# Patient Record
Sex: MALE | Race: WHITE | NOT HISPANIC OR LATINO
[De-identification: names, ages, dates, MRNs, and addresses within clinical notes are randomized per-mention and may not be internally consistent; named-entity substitution may affect disease eponyms.]

---

## 2019-01-22 ENCOUNTER — RX RENEWAL (OUTPATIENT)
Age: 49
End: 2019-01-22

## 2019-02-04 ENCOUNTER — RX RENEWAL (OUTPATIENT)
Age: 49
End: 2019-02-04

## 2019-02-04 ENCOUNTER — OTHER (OUTPATIENT)
Age: 49
End: 2019-02-04

## 2019-05-07 ENCOUNTER — RECORD ABSTRACTING (OUTPATIENT)
Age: 49
End: 2019-05-07

## 2019-09-26 ENCOUNTER — APPOINTMENT (OUTPATIENT)
Dept: ENDOCRINOLOGY | Facility: CLINIC | Age: 49
End: 2019-09-26
Payer: COMMERCIAL

## 2019-09-26 VITALS
HEART RATE: 64 BPM | DIASTOLIC BLOOD PRESSURE: 68 MMHG | BODY MASS INDEX: 31.69 KG/M2 | SYSTOLIC BLOOD PRESSURE: 122 MMHG | WEIGHT: 234 LBS | HEIGHT: 72 IN

## 2019-09-26 PROCEDURE — 99213 OFFICE O/P EST LOW 20 MIN: CPT

## 2019-09-26 NOTE — ASSESSMENT
[FreeTextEntry1] : Hypothyroidism, clinically euthyroid on replacement\par Updated thyroid function tests ordered

## 2019-09-26 NOTE — PHYSICAL EXAM
[Thyroid Not Enlarged] : the thyroid was not enlarged [Regular Rhythm] : with a regular rhythm [Clear to Auscultation] : lungs were clear to auscultation bilaterally

## 2019-11-08 ENCOUNTER — RX RENEWAL (OUTPATIENT)
Age: 49
End: 2019-11-08

## 2020-09-28 ENCOUNTER — APPOINTMENT (OUTPATIENT)
Dept: ENDOCRINOLOGY | Facility: CLINIC | Age: 50
End: 2020-09-28

## 2020-11-11 ENCOUNTER — APPOINTMENT (OUTPATIENT)
Dept: ENDOCRINOLOGY | Facility: CLINIC | Age: 50
End: 2020-11-11
Payer: COMMERCIAL

## 2020-11-11 PROCEDURE — 99441: CPT

## 2020-11-11 NOTE — HISTORY OF PRESENT ILLNESS
[Home] : at home, [unfilled] , at the time of the visit. [Other Location: e.g. Home (Enter Location, City,State)___] : at [unfilled] [Verbal consent obtained from patient] : the patient, [unfilled] [FreeTextEntry1] : Hypothyroid for 18 years, found on screening due to family history.\par On T4 .2

## 2021-06-27 ENCOUNTER — RX RENEWAL (OUTPATIENT)
Age: 51
End: 2021-06-27

## 2022-05-09 ENCOUNTER — APPOINTMENT (OUTPATIENT)
Dept: ENDOCRINOLOGY | Facility: CLINIC | Age: 52
End: 2022-05-09
Payer: COMMERCIAL

## 2022-05-09 PROCEDURE — 99214 OFFICE O/P EST MOD 30 MIN: CPT | Mod: 95

## 2022-05-09 NOTE — HISTORY OF PRESENT ILLNESS
[Home] : at home, [unfilled] , at the time of the visit. [Medical Office: ([facility])___] : at the medical office located in  [Verbal consent obtained from patient] : the patient, [unfilled] [FreeTextEntry1] : Hypothyroid for 20 years, found on screening due to family history.\par On T4 .2

## 2022-08-09 ENCOUNTER — RX RENEWAL (OUTPATIENT)
Age: 52
End: 2022-08-09

## 2022-11-21 ENCOUNTER — RX RENEWAL (OUTPATIENT)
Age: 52
End: 2022-11-21

## 2023-04-04 ENCOUNTER — APPOINTMENT (OUTPATIENT)
Dept: FAMILY MEDICINE | Facility: CLINIC | Age: 53
End: 2023-04-04
Payer: COMMERCIAL

## 2023-04-04 VITALS
BODY MASS INDEX: 31.42 KG/M2 | WEIGHT: 232 LBS | OXYGEN SATURATION: 97 % | HEIGHT: 72 IN | DIASTOLIC BLOOD PRESSURE: 80 MMHG | SYSTOLIC BLOOD PRESSURE: 132 MMHG | TEMPERATURE: 96.3 F | HEART RATE: 87 BPM

## 2023-04-04 DIAGNOSIS — Z00.00 ENCOUNTER FOR GENERAL ADULT MEDICAL EXAMINATION W/OUT ABNORMAL FINDINGS: ICD-10-CM

## 2023-04-04 PROCEDURE — 99386 PREV VISIT NEW AGE 40-64: CPT

## 2023-04-04 NOTE — HISTORY OF PRESENT ILLNESS
[de-identified] : Pt presenting for CPE\par Doing well\par Takes levoxyl 200 mcg on daily basis, got thyroid checked 6 months prior \par Takes multiple vitamins and supplements on daily basis. \par \par Fam hx- bypass in father, CAD\par

## 2023-04-04 NOTE — ASSESSMENT
[FreeTextEntry1] : Pt presenting for annual physical. \par \par Reviewed diet, exercise (150 min/moderate intensity exercise weekly), lifestyle modifications. \par Discussed STD prevention and discussed screening studies per below:\par \par Colon Cancer- GI referral \par \par Labs ordered per above. \par f.u in 1 year or earlier if needed \par \par Cardio and Gastro referral given\par

## 2023-04-07 ENCOUNTER — NON-APPOINTMENT (OUTPATIENT)
Age: 53
End: 2023-04-07

## 2023-04-07 LAB
25(OH)D3 SERPL-MCNC: 99.9 NG/ML
ALBUMIN SERPL ELPH-MCNC: 4.5 G/DL
ALP BLD-CCNC: 85 U/L
ALT SERPL-CCNC: 41 U/L
ANION GAP SERPL CALC-SCNC: 14 MMOL/L
AST SERPL-CCNC: 25 U/L
BASOPHILS # BLD AUTO: 0.03 K/UL
BASOPHILS NFR BLD AUTO: 0.3 %
BILIRUB SERPL-MCNC: 0.4 MG/DL
BUN SERPL-MCNC: 21 MG/DL
CALCIUM SERPL-MCNC: 9.7 MG/DL
CHLORIDE SERPL-SCNC: 103 MMOL/L
CHOLEST SERPL-MCNC: 231 MG/DL
CO2 SERPL-SCNC: 23 MMOL/L
CREAT SERPL-MCNC: 1.29 MG/DL
EGFR: 66 ML/MIN/1.73M2
EOSINOPHIL # BLD AUTO: 0.21 K/UL
EOSINOPHIL NFR BLD AUTO: 2.1 %
ESTIMATED AVERAGE GLUCOSE: 114 MG/DL
FERRITIN SERPL-MCNC: 91 NG/ML
FOLATE SERPL-MCNC: >20 NG/ML
GLUCOSE SERPL-MCNC: 94 MG/DL
HBA1C MFR BLD HPLC: 5.6 %
HCT VFR BLD CALC: 47.5 %
HDLC SERPL-MCNC: 56 MG/DL
HGB BLD-MCNC: 15.3 G/DL
IMM GRANULOCYTES NFR BLD AUTO: 0.2 %
IRON SATN MFR SERPL: 13 %
IRON SERPL-MCNC: 39 UG/DL
LDLC SERPL CALC-MCNC: 145 MG/DL
LYMPHOCYTES # BLD AUTO: 2.18 K/UL
LYMPHOCYTES NFR BLD AUTO: 21.7 %
MAN DIFF?: NORMAL
MCHC RBC-ENTMCNC: 30.5 PG
MCHC RBC-ENTMCNC: 32.2 GM/DL
MCV RBC AUTO: 94.8 FL
MONOCYTES # BLD AUTO: 1.28 K/UL
MONOCYTES NFR BLD AUTO: 12.8 %
NEUTROPHILS # BLD AUTO: 6.31 K/UL
NEUTROPHILS NFR BLD AUTO: 62.9 %
NONHDLC SERPL-MCNC: 175 MG/DL
PLATELET # BLD AUTO: 223 K/UL
POTASSIUM SERPL-SCNC: 4.2 MMOL/L
PROT SERPL-MCNC: 7.4 G/DL
PSA SERPL-MCNC: 0.91 NG/ML
RBC # BLD: 5.01 M/UL
RBC # FLD: 12.8 %
SODIUM SERPL-SCNC: 139 MMOL/L
T4 FREE SERPL-MCNC: 1.7 NG/DL
TIBC SERPL-MCNC: 314 UG/DL
TRIGL SERPL-MCNC: 151 MG/DL
TSH SERPL-ACNC: 0.39 UIU/ML
UIBC SERPL-MCNC: 275 UG/DL
VIT B12 SERPL-MCNC: 1752 PG/ML
WBC # FLD AUTO: 10.03 K/UL

## 2023-04-20 ENCOUNTER — APPOINTMENT (OUTPATIENT)
Dept: GASTROENTEROLOGY | Facility: CLINIC | Age: 53
End: 2023-04-20
Payer: COMMERCIAL

## 2023-04-20 VITALS
HEIGHT: 72 IN | BODY MASS INDEX: 31.15 KG/M2 | DIASTOLIC BLOOD PRESSURE: 70 MMHG | SYSTOLIC BLOOD PRESSURE: 114 MMHG | HEART RATE: 85 BPM | WEIGHT: 230 LBS | TEMPERATURE: 97.2 F | RESPIRATION RATE: 16 BRPM | OXYGEN SATURATION: 98 %

## 2023-04-20 DIAGNOSIS — Z78.9 OTHER SPECIFIED HEALTH STATUS: ICD-10-CM

## 2023-04-20 PROCEDURE — 99203 OFFICE O/P NEW LOW 30 MIN: CPT

## 2023-04-20 NOTE — PHYSICAL EXAM
[Alert] : alert [Normal Voice/Communication] : normal voice/communication [Healthy Appearing] : healthy appearing [No Acute Distress] : no acute distress [Sclera] : the sclera and conjunctiva were normal [Hearing Threshold Finger Rub Not [place]] : hearing was normal [Normal Lips/Gums] : the lips and gums were normal [Oropharynx] : the oropharynx was normal [Normal Appearance] : the appearance of the neck was normal [No Respiratory Distress] : no respiratory distress [No Acc Muscle Use] : no accessory muscle use [Respiration, Rhythm And Depth] : normal respiratory rhythm and effort [Auscultation Breath Sounds / Voice Sounds] : lungs were clear to auscultation bilaterally [Heart Rate And Rhythm] : heart rate was normal and rhythm regular [Normal S1, S2] : normal S1 and S2 [Murmurs] : no murmurs [Bowel Sounds] : normal bowel sounds [Abdomen Tenderness] : non-tender [No Masses] : no abdominal mass palpated [Abdomen Soft] : soft [Abnormal Walk] : normal gait [Involuntary Movements] : no involuntary movements were seen [Motor Tone] : muscle strength and tone were normal [Normal Color / Pigmentation] : normal skin color and pigmentation [] : no rash [No Focal Deficits] : no focal deficits [Motor Exam] : the motor exam was normal [Normal Affect] : the affect was normal [Oriented To Time, Place, And Person] : oriented to person, place, and time [Normal Mood] : the mood was normal

## 2023-04-20 NOTE — HISTORY OF PRESENT ILLNESS
[FreeTextEntry1] : Patient is referred for routine colon cancer screening.  He has never undergone a colonoscopy.  There is no family history of colon cancer.  He denies any abdominal pain, nausea, vomiting, diarrhea or constipation.  For the past 2 years on rare occasion he might note some scant blood on the toilet paper after a bowel movement.  There are no dyspeptic symptoms.  His appetite and weight are stable.

## 2023-04-20 NOTE — ASSESSMENT
[FreeTextEntry1] : At this time the patient is certainly a candidate for an initial screening colonoscopy which will be scheduled. The risks, benefits, complications and possible adverse consequences associated with colonoscopy were discussed with the patient.  The scant blood on the tissue is probably either hemorrhoidal or some perianal irritation.\par

## 2023-04-28 RX ORDER — SODIUM SULFATE, POTASSIUM SULFATE AND MAGNESIUM SULFATE 1.6; 3.13; 17.5 G/177ML; G/177ML; G/177ML
17.5-3.13-1.6 SOLUTION ORAL
Qty: 1 | Refills: 0 | Status: ACTIVE | COMMUNITY
Start: 2023-04-20 | End: 1900-01-01

## 2023-06-28 ENCOUNTER — APPOINTMENT (OUTPATIENT)
Dept: CARDIOLOGY | Facility: CLINIC | Age: 53
End: 2023-06-28
Payer: COMMERCIAL

## 2023-06-28 ENCOUNTER — NON-APPOINTMENT (OUTPATIENT)
Age: 53
End: 2023-06-28

## 2023-06-28 VITALS
DIASTOLIC BLOOD PRESSURE: 76 MMHG | HEART RATE: 81 BPM | BODY MASS INDEX: 30.34 KG/M2 | HEIGHT: 72 IN | OXYGEN SATURATION: 97 % | SYSTOLIC BLOOD PRESSURE: 138 MMHG | WEIGHT: 224 LBS

## 2023-06-28 DIAGNOSIS — Z12.11 ENCOUNTER FOR SCREENING FOR MALIGNANT NEOPLASM OF COLON: ICD-10-CM

## 2023-06-28 DIAGNOSIS — Z13.6 ENCOUNTER FOR SCREENING FOR CARDIOVASCULAR DISORDERS: ICD-10-CM

## 2023-06-28 DIAGNOSIS — E78.2 MIXED HYPERLIPIDEMIA: ICD-10-CM

## 2023-06-28 DIAGNOSIS — Z13.228 ENCOUNTER FOR SCREENING FOR OTHER SUSPECTED ENDOCRINE DISORDER: ICD-10-CM

## 2023-06-28 DIAGNOSIS — Z13.29 ENCOUNTER FOR SCREENING FOR OTHER SUSPECTED ENDOCRINE DISORDER: ICD-10-CM

## 2023-06-28 DIAGNOSIS — R03.0 ELEVATED BLOOD-PRESSURE READING, W/OUT DIAGNOSIS OF HYPERTENSION: ICD-10-CM

## 2023-06-28 DIAGNOSIS — R01.1 CARDIAC MURMUR, UNSPECIFIED: ICD-10-CM

## 2023-06-28 DIAGNOSIS — Z13.0 ENCOUNTER FOR SCREENING FOR OTHER SUSPECTED ENDOCRINE DISORDER: ICD-10-CM

## 2023-06-28 PROCEDURE — 99204 OFFICE O/P NEW MOD 45 MIN: CPT | Mod: 25

## 2023-06-28 PROCEDURE — 93000 ELECTROCARDIOGRAM COMPLETE: CPT

## 2023-06-28 NOTE — HISTORY OF PRESENT ILLNESS
[FreeTextEntry1] : Rajinder Rodriguez is a 52-year-old man with a history of hypothyroidism, hyperlipidemia, overweight, and family history of ischemic heart disease (father underwent bypass surgery in mid 4th decade), who presents for cardiology consultation.  He has been feeling well and has not been experiencing chest discomfort, dyspnea, or palpitations.  He is concerned about risk factors for heart disease and requests screening.  He has a good baseline functional status––works as a  and golfs.  He describes a recent lifestyle modifications with a goal of weight loss and improve overall cardiovascular health.\par \par *I reviewed recent laboratory data (April 2023): Cholesterol is suboptimally controlled (total 231, , triglyceride 151, and HDL 56)\par

## 2023-06-28 NOTE — PHYSICAL EXAM
[No Acute Distress] : no acute distress [Normal S1, S2] : normal S1, S2 [Clear Lung Fields] : clear lung fields [Soft] : abdomen soft [Non Tender] : non-tender [Normal Gait] : normal gait [Gait - Sufficient for Exercise Testing] : gait - sufficient for exercise testing [No Edema] : no edema [Moves all extremities] : moves all extremities [Normal Speech] : normal speech [Alert and Oriented] : alert and oriented [de-identified] :   Overweight [de-identified] :  extraocular muscles are intact [de-identified] :  normocephalic [de-identified] :  no JVD [de-identified] :  systolic murmur [de-identified] :  warm and dry

## 2023-06-28 NOTE — REVIEW OF SYSTEMS
[Weight Loss (___ Lbs)] : [unfilled] ~Ulb weight loss [Negative] : Heme/Lymph [SOB] : no shortness of breath [Chest Discomfort] : no chest discomfort [Palpitations] : no palpitations

## 2023-06-28 NOTE — DISCUSSION/SUMMARY
[FreeTextEntry1] : \par Hyperlipidemia: Elevated LDL––trial of lifestyle modification with diet, exercise, and weight loss are reasonable.  He describes approximate 10 pound weight loss over the past few months as he tries to improve his overall health.  His goal LDL should be less than 100.\par \par Elevated blood pressure in the absence of established hypertension: Mild– other blood pressure measurements in his chart are 114/70 and 132/80; may have prehypertension.  We discussed the importance of weight loss and exercise;  blood pressure should be monitored.\par \par Murmur: Murmur heard on cardiac auscultation; I recommend echocardiography.\par \par Screening for heart disease: Several risk factors for ischemic heart disease, including family history of premature CAD,  overweight, and elevated cholesterol.  I recommend exercise ECG stress test\par \par Hypothyroidism: Chronic;  recent TSH and free T4 are within normal limits;   Continue levothyroxine.

## 2023-07-10 ENCOUNTER — RX RENEWAL (OUTPATIENT)
Age: 53
End: 2023-07-10

## 2023-07-10 ENCOUNTER — TRANSCRIPTION ENCOUNTER (OUTPATIENT)
Age: 53
End: 2023-07-10

## 2023-07-11 ENCOUNTER — OUTPATIENT (OUTPATIENT)
Dept: OUTPATIENT SERVICES | Facility: HOSPITAL | Age: 53
LOS: 1 days | End: 2023-07-11
Payer: COMMERCIAL

## 2023-07-11 ENCOUNTER — APPOINTMENT (OUTPATIENT)
Dept: GASTROENTEROLOGY | Facility: GI CENTER | Age: 53
End: 2023-07-11
Payer: COMMERCIAL

## 2023-07-11 DIAGNOSIS — Z12.11 ENCOUNTER FOR SCREENING FOR MALIGNANT NEOPLASM OF COLON: ICD-10-CM

## 2023-07-11 DIAGNOSIS — R58 HEMORRHAGE, NOT ELSEWHERE CLASSIFIED: ICD-10-CM

## 2023-07-11 PROCEDURE — 45378 DIAGNOSTIC COLONOSCOPY: CPT | Mod: PT

## 2023-07-11 PROCEDURE — 45378 DIAGNOSTIC COLONOSCOPY: CPT

## 2023-07-11 NOTE — PHYSICAL EXAM
[Alert] : alert [Normal Voice/Communication] : normal voice/communication [Healthy Appearing] : healthy appearing [No Acute Distress] : no acute distress [Sclera] : the sclera and conjunctiva were normal [Hearing Threshold Finger Rub Not [place]] : hearing was normal [Normal Lips/Gums] : the lips and gums were normal [Oropharynx] : the oropharynx was normal [Normal Appearance] : the appearance of the neck was normal [No Respiratory Distress] : no respiratory distress [No Acc Muscle Use] : no accessory muscle use [Respiration, Rhythm And Depth] : normal respiratory rhythm and effort [Auscultation Breath Sounds / Voice Sounds] : lungs were clear to auscultation bilaterally [Heart Rate And Rhythm] : heart rate was normal and rhythm regular [Normal S1, S2] : normal S1 and S2 [Murmurs] : no murmurs [Bowel Sounds] : normal bowel sounds [Abdomen Tenderness] : non-tender [No Masses] : no abdominal mass palpated [Abdomen Soft] : soft [Abnormal Walk] : normal gait [Involuntary Movements] : no involuntary movements were seen [Motor Tone] : muscle strength and tone were normal [Normal Color / Pigmentation] : normal skin color and pigmentation [] : no rash [No Focal Deficits] : no focal deficits [Motor Exam] : the motor exam was normal [Oriented To Time, Place, And Person] : oriented to person, place, and time [Normal Affect] : the affect was normal [Normal Mood] : the mood was normal

## 2023-07-19 ENCOUNTER — NON-APPOINTMENT (OUTPATIENT)
Age: 53
End: 2023-07-19

## 2023-08-09 ENCOUNTER — APPOINTMENT (OUTPATIENT)
Dept: CARDIOLOGY | Facility: CLINIC | Age: 53
End: 2023-08-09
Payer: COMMERCIAL

## 2023-08-09 PROCEDURE — 93015 CV STRESS TEST SUPVJ I&R: CPT

## 2023-08-09 PROCEDURE — 93306 TTE W/DOPPLER COMPLETE: CPT

## 2023-10-02 ENCOUNTER — APPOINTMENT (OUTPATIENT)
Dept: ENDOCRINOLOGY | Facility: CLINIC | Age: 53
End: 2023-10-02
Payer: COMMERCIAL

## 2023-10-02 ENCOUNTER — RX RENEWAL (OUTPATIENT)
Age: 53
End: 2023-10-02

## 2023-10-02 VITALS
OXYGEN SATURATION: 97 % | HEIGHT: 72 IN | DIASTOLIC BLOOD PRESSURE: 80 MMHG | WEIGHT: 233 LBS | HEART RATE: 79 BPM | SYSTOLIC BLOOD PRESSURE: 122 MMHG | BODY MASS INDEX: 31.56 KG/M2

## 2023-10-02 DIAGNOSIS — E03.9 HYPOTHYROIDISM, UNSPECIFIED: ICD-10-CM

## 2023-10-02 PROCEDURE — 99214 OFFICE O/P EST MOD 30 MIN: CPT

## 2023-10-02 RX ORDER — LEVOTHYROXINE SODIUM 0.03 MG/1
25 TABLET ORAL
Qty: 90 | Refills: 3 | Status: ACTIVE | COMMUNITY
Start: 2022-06-01 | End: 1900-01-01

## 2023-10-02 RX ORDER — LEVOTHYROXINE SODIUM 0.2 MG/1
200 TABLET ORAL
Qty: 90 | Refills: 3 | Status: ACTIVE | COMMUNITY
Start: 2019-01-22 | End: 1900-01-01

## 2024-07-26 ENCOUNTER — NON-APPOINTMENT (OUTPATIENT)
Age: 54
End: 2024-07-26

## 2024-07-30 ENCOUNTER — APPOINTMENT (OUTPATIENT)
Dept: FAMILY MEDICINE | Facility: CLINIC | Age: 54
End: 2024-07-30
Payer: COMMERCIAL

## 2024-07-30 VITALS
HEART RATE: 73 BPM | SYSTOLIC BLOOD PRESSURE: 124 MMHG | HEIGHT: 72 IN | DIASTOLIC BLOOD PRESSURE: 78 MMHG | BODY MASS INDEX: 31.69 KG/M2 | WEIGHT: 234 LBS | OXYGEN SATURATION: 97 % | TEMPERATURE: 97.8 F

## 2024-07-30 DIAGNOSIS — M25.511 PAIN IN RIGHT SHOULDER: ICD-10-CM

## 2024-07-30 PROCEDURE — 99213 OFFICE O/P EST LOW 20 MIN: CPT

## 2024-07-30 NOTE — PHYSICAL EXAM
[No Acute Distress] : no acute distress [Well-Appearing] : well-appearing [No Spinal Tenderness] : no spinal tenderness [No Rash] : no rash [de-identified] : right trapezius hypertonicity; positive right apley scratch test

## 2024-07-30 NOTE — ASSESSMENT
[FreeTextEntry1] : #Right shoulder pain, neuropathy - Presenting to the office complaining of right upper back pain with numbness and tingling traversing down to the right elbow - On examination, there is right trapezius hypertonicity with tender point near spine of scapula. He has significant reduced ROM of the right shoulder and a positive Apley scratch test - Negative Tinel sign at right elbow and right wrist  - Concern for rotator cuff pathology vs thoracic outlet syndrome - Restart NSAIDs, alternate heat and ice - Orthopedics referral

## 2024-07-30 NOTE — HISTORY OF PRESENT ILLNESS
[FreeTextEntry8] : 55yo male presents to the office complaining of back pain near right shoulder pain.  Reports this started one month ago and shortly after developed numbness and tingling that went down the right arm to the elbow. Reports these sensations are intermittent. Endorses feeling like "dead arm."  Denies any known trauma or injury to the area, reports that it started one day in the middle of the day.  Feels like the arm is slightly weak, especially if he grabs a door handle.  Reports if he massages the area it will improve but only temporarily, pain and numbness/tingling will always return.  Was taking Advil in the beginning, but stopped taking this.

## 2024-07-30 NOTE — REVIEW OF SYSTEMS
[Muscle Pain] : muscle pain [Fever] : no fever [Chills] : no chills [Headache] : no headache [Dizziness] : no dizziness

## 2024-08-08 ENCOUNTER — NON-APPOINTMENT (OUTPATIENT)
Age: 54
End: 2024-08-08

## 2024-08-08 ENCOUNTER — APPOINTMENT (OUTPATIENT)
Dept: ORTHOPEDIC SURGERY | Facility: CLINIC | Age: 54
End: 2024-08-08

## 2024-08-08 PROBLEM — M54.12 CERVICAL RADICULOPATHY: Status: ACTIVE | Noted: 2024-08-08

## 2024-08-08 PROBLEM — G25.89 SCAPULAR DYSKINESIS: Status: ACTIVE | Noted: 2024-08-08

## 2024-08-08 PROBLEM — M62.838 TRAPEZIUS MUSCLE SPASM: Status: ACTIVE | Noted: 2024-08-08

## 2024-08-08 PROCEDURE — 72040 X-RAY EXAM NECK SPINE 2-3 VW: CPT

## 2024-08-08 PROCEDURE — 73030 X-RAY EXAM OF SHOULDER: CPT | Mod: RT

## 2024-08-08 PROCEDURE — 99204 OFFICE O/P NEW MOD 45 MIN: CPT

## 2024-08-08 NOTE — PHYSICAL EXAM
[de-identified] : General: Well appearing, no acute distress Neurologic: A&Ox3, No focal deficits Head: NCAT without abrasions, lacerations, or ecchymosis to head, face, or scalp Eyes: No scleral icterus, no gross abnormalities Respiratory: Equal chest wall expansion bilaterally, no accessory muscle use Lymphatic: No lymphadenopathy palpated Skin: Warm and dry Psychiatric: Normal mood and affect   Examination of the right shoulder reveals no obvious deformity.  There is no evidence of muscle atrophy.  The skin is intact.  There is no swelling, erythema, or ecchymosis.  The patient is nontender to palpation throughout the right shoulder including the anterior aspect of the shoulder near the bicipital groove, a.c. joint, trapezius and posterior shoulder.  The patient's active range of motion is as follows: Forward flexion to 175 degrees external rotation to 75 degrees and internal rotation to an upper lumbar level.  The patient has 5 out of 5 strength to forward flexion with pronation, internal and external rotation.  The patient has negative Mosqueda and Neer's tests.  There is no pain with cross body abduction testing.  There is a negative a.c. compression test and no sulcus sign present.  There is a negative liftoff test and negative yergason test.  The compartments of the arm are soft and nontender without evidence of DVT or compartment syndrome.  The patient is grossly neurovascularly intact distally. [de-identified] : 4 views of right shoulder were performed today and available for me to review. Results were discussed with the patient. They demonstrate no f/x, dislocation, or other deformity.  2 views of cervical spine were performed today and available for me to review. Results were discussed with the patient. They demonstrate no f/x, dislocation. Cervical spine is lacking lordosis on x-ray.

## 2024-08-08 NOTE — PHYSICAL EXAM
[de-identified] : General: Well appearing, no acute distress Neurologic: A&Ox3, No focal deficits Head: NCAT without abrasions, lacerations, or ecchymosis to head, face, or scalp Eyes: No scleral icterus, no gross abnormalities Respiratory: Equal chest wall expansion bilaterally, no accessory muscle use Lymphatic: No lymphadenopathy palpated Skin: Warm and dry Psychiatric: Normal mood and affect   Examination of the right shoulder reveals no obvious deformity.  There is no evidence of muscle atrophy.  The skin is intact.  There is no swelling, erythema, or ecchymosis.  The patient is nontender to palpation throughout the right shoulder including the anterior aspect of the shoulder near the bicipital groove, a.c. joint, trapezius and posterior shoulder.  The patient's active range of motion is as follows: Forward flexion to 175 degrees external rotation to 75 degrees and internal rotation to an upper lumbar level.  The patient has 5 out of 5 strength to forward flexion with pronation, internal and external rotation.  The patient has negative Mosqueda and Neer's tests.  There is no pain with cross body abduction testing.  There is a negative a.c. compression test and no sulcus sign present.  There is a negative liftoff test and negative yergason test.  The compartments of the arm are soft and nontender without evidence of DVT or compartment syndrome.  The patient is grossly neurovascularly intact distally. [de-identified] : 4 views of right shoulder were performed today and available for me to review. Results were discussed with the patient. They demonstrate no f/x, dislocation, or other deformity.  2 views of cervical spine were performed today and available for me to review. Results were discussed with the patient. They demonstrate no f/x, dislocation. Cervical spine is lacking lordosis on x-ray.

## 2024-08-08 NOTE — CONSULT LETTER
[Dear  ___] : Dear  [unfilled], [Consult Letter:] : I had the pleasure of evaluating your patient, [unfilled]. [Please see my note below.] : Please see my note below. [Sincerely,] : Sincerely, [FreeTextEntry3] : Dr. Jerry Rushing

## 2024-08-08 NOTE — ADDENDUM
[FreeTextEntry1] : Documented by Angelica Lynn acting as a scribe for Dr. Rushing and Chico Garcia PA-C on 08/08/2024. All medical record entries made by the Scribe were at my, Dr. Rushing, and Chico Garcia's, direction and personally dictated by me on 08/08/2024. I have reviewed the chart and agree that the record accurately reflects my personal performance of the history, physical exam, procedure and imaging.

## 2024-08-08 NOTE — HISTORY OF PRESENT ILLNESS
[Worsening] : worsening [___ mths] : [unfilled] month(s) ago [de-identified] : MELANIA TINAJERO is a 54 year old male being seen for atraumatic right shoulder pain for 5 weeks. He is here with complaints of a muscle spasm in his shoulder blade with numbness and tingling in his right arm. He notes the numbness and tingling he experiences is intermittent throughout the day. He notes he feels decreased range of motion in the involved shoulder. He notes he feels occasional weakness in his arm. He states he is right hand dominant.

## 2024-08-08 NOTE — DISCUSSION/SUMMARY
[de-identified] : We had a thorough discussion regarding the nature of his pain, the pathophysiology, as well as all treatment options. I discussed operative and non-operative treatment modalities. Advise patient follow up with either Dr. Elaine, or Dr. Lam for further evaluation. All questions were answered and the patient verbalized understanding. The patient is in agreement with this treatment plan. Patient will follow up as needed for repeat clinical assessment.

## 2024-08-08 NOTE — DISCUSSION/SUMMARY
[de-identified] : We had a thorough discussion regarding the nature of his pain, the pathophysiology, as well as all treatment options. I discussed operative and non-operative treatment modalities. Advise patient follow up with either Dr. Elaine, or Dr. Lam for further evaluation. All questions were answered and the patient verbalized understanding. The patient is in agreement with this treatment plan. Patient will follow up as needed for repeat clinical assessment.

## 2024-08-08 NOTE — HISTORY OF PRESENT ILLNESS
[Worsening] : worsening [___ mths] : [unfilled] month(s) ago [de-identified] : MELANIA TINAJERO is a 54 year old male being seen for atraumatic right shoulder pain for 5 weeks. He is here with complaints of a muscle spasm in his shoulder blade with numbness and tingling in his right arm. He notes the numbness and tingling he experiences is intermittent throughout the day. He notes he feels decreased range of motion in the involved shoulder. He notes he feels occasional weakness in his arm. He states he is right hand dominant.

## 2024-08-19 ENCOUNTER — APPOINTMENT (OUTPATIENT)
Dept: PHYSICAL MEDICINE AND REHAB | Facility: CLINIC | Age: 54
End: 2024-08-19
Payer: COMMERCIAL

## 2024-08-19 VITALS
RESPIRATION RATE: 15 BRPM | DIASTOLIC BLOOD PRESSURE: 82 MMHG | BODY MASS INDEX: 31.83 KG/M2 | HEART RATE: 74 BPM | SYSTOLIC BLOOD PRESSURE: 125 MMHG | WEIGHT: 235 LBS | HEIGHT: 72 IN

## 2024-08-19 DIAGNOSIS — M54.12 RADICULOPATHY, CERVICAL REGION: ICD-10-CM

## 2024-08-19 DIAGNOSIS — M25.511 PAIN IN RIGHT SHOULDER: ICD-10-CM

## 2024-08-19 DIAGNOSIS — G25.89 OTHER SPECIFIED EXTRAPYRAMIDAL AND MOVEMENT DISORDERS: ICD-10-CM

## 2024-08-19 PROCEDURE — 99204 OFFICE O/P NEW MOD 45 MIN: CPT

## 2024-08-19 NOTE — DATA REVIEWED
[FreeTextEntry1] : X-ray C Spine 8/8/24 reviewed and interpreted by me: loss of cervical lordosis seen

## 2024-08-19 NOTE — ASSESSMENT
[FreeTextEntry1] : Mr. MELANIA TINAJERO is a 54 year old male presenting for 6 weeks of radiating right shoulder pain. At this time, pain most consistent with right sided cervical radiculopathy. Denies any red flag signs. Will recommend: -reviewed C spine XRs -PT prescription (sent today) - 2-3x/week for 6-8 weeks for strengthening, stretching, and ROM of the c spine using modalities prn (heat, massage, etc) and to develop a HEP -MRI C spine ordered today -advised patient ok to take advil prn but to avoid taking NSAIDs on a daily basis, may supplement pain prn medications w/ tylenol and topical medications such as lidocaine, biofreeze, etc   RTC in 3-4 weeks. Patient aware of red flag signs including any changes to their bowel/bladder control, groin numbness or new weakness. Patient knows to seek immediate attention by calling 911 or going to nearest ER if these symptoms appear.

## 2024-08-19 NOTE — PHYSICAL EXAM
[FreeTextEntry1] : PE: Constitutional: In NAD, calm and cooperative MSK (Neck): 	Inspection: no gross swelling identified 	Palpation: Tenderness of the medial border of the right scapula 	ROM: Pain at end cervical extension 	Strength: 5/5 strength in bilateral upper extremities 	Reflexes: 2+ Biceps/Brachioradialis/Triceps/patella/Achilles reflex bilaterally, Hoffmans/Clonus negative bilaterally 	Sensation: Intact to light touch in bilateral upper extremities 	Special tests: Spurlings test negative bilaterally

## 2024-08-19 NOTE — HISTORY OF PRESENT ILLNESS
[FreeTextEntry1] : Mr. MELANIA TINAJERO  is a 54 year old male who presents with right shoulder blade pain  Location: medial border of the right shoulder pain, upper back Onset: Started around late 6/2024, no inciting event Provocation/Palliative: worse with positioning in patient's care or sitting for long periods of time with next in extension, massaging the area with a theragun improves some pain Quality: pulling sensation Radiation: travels down the R arm into the finger tips with associated numbness, tingling, feeling like he "hit his funny bone" Severity: Moderate Timing: near constant without improvement over past 5-6 weeks  Admits to numbness/tingling in R arm at times.  Denies any associated arm or hand weakness. Denies any loss of bowel/bladder control or any groin numbness. Previous medications trialed: Advil with some relief Previous procedures relevant to complaint: none Has tried conservative treatment?: none

## 2024-09-03 ENCOUNTER — OUTPATIENT (OUTPATIENT)
Dept: OUTPATIENT SERVICES | Facility: HOSPITAL | Age: 54
LOS: 1 days | End: 2024-09-03
Payer: COMMERCIAL

## 2024-09-03 ENCOUNTER — APPOINTMENT (OUTPATIENT)
Dept: MRI IMAGING | Facility: CLINIC | Age: 54
End: 2024-09-03
Payer: COMMERCIAL

## 2024-09-03 DIAGNOSIS — M54.12 RADICULOPATHY, CERVICAL REGION: ICD-10-CM

## 2024-09-03 PROCEDURE — 72141 MRI NECK SPINE W/O DYE: CPT | Mod: 26

## 2024-09-03 PROCEDURE — 72141 MRI NECK SPINE W/O DYE: CPT

## 2024-09-06 ENCOUNTER — TRANSCRIPTION ENCOUNTER (OUTPATIENT)
Age: 54
End: 2024-09-06

## 2024-09-26 ENCOUNTER — APPOINTMENT (OUTPATIENT)
Dept: PHYSICAL MEDICINE AND REHAB | Facility: CLINIC | Age: 54
End: 2024-09-26
Payer: COMMERCIAL

## 2024-09-26 VITALS
HEART RATE: 74 BPM | SYSTOLIC BLOOD PRESSURE: 134 MMHG | BODY MASS INDEX: 31.83 KG/M2 | OXYGEN SATURATION: 98 % | DIASTOLIC BLOOD PRESSURE: 86 MMHG | HEIGHT: 72 IN | WEIGHT: 235 LBS | RESPIRATION RATE: 14 BRPM

## 2024-09-26 DIAGNOSIS — M79.10 MYALGIA, UNSPECIFIED SITE: ICD-10-CM

## 2024-09-26 DIAGNOSIS — M25.511 PAIN IN RIGHT SHOULDER: ICD-10-CM

## 2024-09-26 DIAGNOSIS — M54.12 RADICULOPATHY, CERVICAL REGION: ICD-10-CM

## 2024-09-26 PROCEDURE — 99214 OFFICE O/P EST MOD 30 MIN: CPT | Mod: 25

## 2024-09-26 PROCEDURE — 20553 NJX 1/MLT TRIGGER POINTS 3/>: CPT

## 2024-09-26 RX ORDER — METHYLPREDNISOLONE 4 MG/1
4 TABLET ORAL
Qty: 1 | Refills: 0 | Status: ACTIVE | COMMUNITY
Start: 2024-09-26 | End: 1900-01-01

## 2024-09-26 NOTE — ASSESSMENT
[FreeTextEntry1] : Mr. MELANIA TINAJERO is a 54 year old male presenting for 6 weeks of radiating right shoulder pain. At this time, pain most consistent with right sided cervical radiculopathy in combination with myofascial based pain. Denies any red flag signs. Will recommend: - Reviewed MRI C Spine with patient - TPI performed today, tolerated well - Continue PT - Medrol dose shayne Rx given, to take as recommended. Patient advised on potential side effects including but not limited to increased risk of elevated blood sugar, blood pressure, and infection. Patient encouraged to take medication with food and not with NSAIDs.  - Discussed with patient the risks (including but not limited to bleeding, elevated blood sugar, allergic reaction, infection, nerve damage, etc), benefits and alternatives to an epidural steroid injection for which patient understands. Will see how he does with medrol dose shayne, more PT and the TPIs first.    RTC in 3-4 weeks. Patient aware of red flag signs including any changes to their bowel/bladder control, groin numbness or new weakness. Patient knows to seek immediate attention by calling 911 or going to nearest ER if these symptoms appear.  This patient is being managed for a complex chronic pain that requires ongoing medical management. The nature of this condition requires a longitudinal relationship and monitoring over time for appropriate treatment.

## 2024-09-26 NOTE — PHYSICAL EXAM
[FreeTextEntry1] : PE: Constitutional: In NAD, calm and cooperative MSK (Neck):  Inspection: no gross swelling identified  Palpation: Tenderness of the medial border of the right scapula and over R cervical paraspinals  ROM: Pain at end cervical extension  Strength: 5/5 strength in bilateral upper extremities  Reflexes: 2+ Biceps/Brachioradialis/Triceps/patella/Achilles reflex bilaterally, Hoffmans/Clonus negative bilaterally  Sensation: Intact to light touch in bilateral upper extremities  Special tests: Spurlings test negative bilaterally.

## 2024-09-26 NOTE — HISTORY OF PRESENT ILLNESS
[FreeTextEntry1] : Mr. MELANIA TINAJERO is a 54 year old male who presents for follow up. At last visit, he was ordered PT, MRI C Spine, and was continued on OTC meds. He feels a little better with PT but still having significant pain.    Location: medial border of the right shoulder pain, upper back Onset: Started around late 6/2024, no inciting event Provocation/Palliative: worse with positioning in patient's care or sitting for long periods of time with next in extension, massaging the area with a theragun improves some pain Quality: pulling sensation Radiation: travels down the R arm into the finger tips with associated numbness, tingling, feeling like he "hit his funny bone" Severity: Moderate Timing: near constant without improvement over past 5-6 weeks  No bowel/bladder changes. No groin numbness.

## 2024-09-26 NOTE — PROCEDURE
[de-identified] :  Procedure: Trigger Point Injections Physician: Dr. Lam Medication injected: Lidocaine 1%, 5cc total Sedation medications: None Estimated blood loss: None Complications: None   Technique: R/B/A to trigger point injection reviewed with patient. The patient is agreeable and wishes to proceed.   The patient was placed in the seated position and trigger points of the right trapezius, levator scapulae and cervical paraspinals were identified. The area was prepped in normal sterile fashion with Chloroprep. A 27 gauge 1.25 inch needle was advanced into the palpable trigger points with reproduction of pain. After negative aspiration of heme, the above medications were injected into the trigger areas. Needle was then removed, bandaid placed over injection sites. There was no complications, the patient was provided with post injection instructions.

## 2024-09-26 NOTE — DATA REVIEWED
[FreeTextEntry1] : 	 EXAM: [id number] - MR SPINE CERVICAL  - ORDERED BY: RICARDO ARAUJO   PROCEDURE DATE:  09/03/2024    INTERPRETATION:  MR CERVICAL SPINE WITHOUT CONTRAST  TECHNIQUE: Multiplanar multisequence imaging of the cervical spine was performed without the administration of intravenous contrast.  CLINICAL INDICATION: Pain. Persistent right upper extremity radicular symptoms. Cervical radiculopathy. COMPARISON: None. ____________________ FINDINGS: Mildly degraded by motion artifact. Very mild cervical levoscoliosis with an apex near C4-C5. Overall there is straightening of the normal cervical lordosis. Very mild multilevel endplate degenerative changes. Vertebral body heights are maintained. No acute fracture. No aggressive osseous lesions.  Normal cord caliber and signal.  No significant abnormality of the visualized intracranial contents.  C2-C3: Disc osteophyte complex. No significant spinal canal stenosis. No significant left neuroforaminal stenosis. Mild right neuroforaminal stenosis. Very mild hypertrophic facet joint degeneration.  C3-C4: Disc osteophyte complex. No significant spinal canal stenosis. No significant right neuroforaminal stenosis. Moderate left neuroforaminal stenosis. Mild hypertrophic facet joint degeneration.  C4-C5: Disc osteophyte complex with cord flattening. No significant spinal canal stenosis. Very mild right neuroforaminal stenosis. Moderate left neuroforaminal stenosis. Mild hypertrophic facet joint degeneration.  C5-C6: Disc osteophyte complex. Mild spinal canal stenosis. Severe left neuroforaminal stenosis. Severe right neuroforaminal stenosis. Very mild hypertrophic facet joint degeneration.  C6-C7: Disc osteophyte complex with cord flattening. Preservation of dorsal subarachnoid space. Severe right neuroforaminal stenosis. Moderate to severe left neuroforaminal stenosis. Mild hypertrophic facet joint degeneration.  C7-T1: Disc osteophyte complex. No significant spinal canal stenosis. No significant right neuroforaminal stenosis. Moderate left neuroforaminal stenosis. Very mild hypertrophic facet joint degeneration.  Paraspinal Tissues: Unremarkable. ____________________ IMPRESSION:  1.  Multilevel cervical spondylosis as detailed above with at most mild spinal canal stenosis and severe neuroforaminal stenosis.  --- End of Report ---       SHILPA BRODY MD; Attending Radiologist This document has been electronically signed. Sep  6 2024  9:50AM

## 2024-10-02 ENCOUNTER — RX RENEWAL (OUTPATIENT)
Age: 54
End: 2024-10-02

## 2024-10-31 ENCOUNTER — APPOINTMENT (OUTPATIENT)
Dept: PHYSICAL MEDICINE AND REHAB | Facility: CLINIC | Age: 54
End: 2024-10-31

## 2024-11-04 ENCOUNTER — APPOINTMENT (OUTPATIENT)
Dept: ORTHOPEDIC SURGERY | Facility: CLINIC | Age: 54
End: 2024-11-04
Payer: OTHER MISCELLANEOUS

## 2024-11-04 VITALS — WEIGHT: 235 LBS | BODY MASS INDEX: 31.83 KG/M2 | HEIGHT: 72 IN

## 2024-11-04 DIAGNOSIS — M54.12 RADICULOPATHY, CERVICAL REGION: ICD-10-CM

## 2024-11-04 PROCEDURE — 73030 X-RAY EXAM OF SHOULDER: CPT | Mod: RT

## 2024-11-04 PROCEDURE — 72040 X-RAY EXAM NECK SPINE 2-3 VW: CPT

## 2024-11-04 PROCEDURE — 99204 OFFICE O/P NEW MOD 45 MIN: CPT

## 2024-11-04 RX ORDER — MELOXICAM 15 MG/1
15 TABLET ORAL
Qty: 30 | Refills: 1 | Status: ACTIVE | COMMUNITY
Start: 2024-11-04 | End: 1900-01-01

## 2024-11-06 ENCOUNTER — APPOINTMENT (OUTPATIENT)
Dept: ORTHOPEDIC SURGERY | Facility: CLINIC | Age: 54
End: 2024-11-06
Payer: OTHER MISCELLANEOUS

## 2024-11-06 DIAGNOSIS — M75.41 IMPINGEMENT SYNDROME OF RIGHT SHOULDER: ICD-10-CM

## 2024-11-06 DIAGNOSIS — G25.89 OTHER SPECIFIED EXTRAPYRAMIDAL AND MOVEMENT DISORDERS: ICD-10-CM

## 2024-11-06 DIAGNOSIS — M25.511 PAIN IN RIGHT SHOULDER: ICD-10-CM

## 2024-11-06 DIAGNOSIS — M75.51 BURSITIS OF RIGHT SHOULDER: ICD-10-CM

## 2024-11-06 PROCEDURE — 99204 OFFICE O/P NEW MOD 45 MIN: CPT

## 2024-11-08 ENCOUNTER — APPOINTMENT (OUTPATIENT)
Dept: MRI IMAGING | Facility: CLINIC | Age: 54
End: 2024-11-08
Payer: OTHER MISCELLANEOUS

## 2024-11-08 PROCEDURE — 72141 MRI NECK SPINE W/O DYE: CPT

## 2024-11-15 LAB — TSH SERPL-ACNC: 1.08

## 2024-11-18 ENCOUNTER — APPOINTMENT (OUTPATIENT)
Dept: MRI IMAGING | Facility: CLINIC | Age: 54
End: 2024-11-18

## 2024-11-18 ENCOUNTER — APPOINTMENT (OUTPATIENT)
Dept: ENDOCRINOLOGY | Facility: CLINIC | Age: 54
End: 2024-11-18
Payer: COMMERCIAL

## 2024-11-18 VITALS
OXYGEN SATURATION: 95 % | HEART RATE: 77 BPM | DIASTOLIC BLOOD PRESSURE: 78 MMHG | SYSTOLIC BLOOD PRESSURE: 118 MMHG | HEIGHT: 72 IN | WEIGHT: 243 LBS | BODY MASS INDEX: 32.91 KG/M2

## 2024-11-18 DIAGNOSIS — E78.2 MIXED HYPERLIPIDEMIA: ICD-10-CM

## 2024-11-18 DIAGNOSIS — E03.9 HYPOTHYROIDISM, UNSPECIFIED: ICD-10-CM

## 2024-11-18 PROCEDURE — 99214 OFFICE O/P EST MOD 30 MIN: CPT

## 2024-11-18 PROCEDURE — 73221 MRI JOINT UPR EXTREM W/O DYE: CPT | Mod: RT

## 2024-11-18 PROCEDURE — G2211 COMPLEX E/M VISIT ADD ON: CPT | Mod: NC

## 2024-11-18 RX ORDER — PRAVASTATIN SODIUM 40 MG/1
40 TABLET ORAL
Qty: 1 | Refills: 3 | Status: ACTIVE | COMMUNITY
Start: 2024-11-18 | End: 1900-01-01

## 2024-11-18 RX ORDER — LEVOTHYROXINE SODIUM 0.11 MG/1
112 TABLET ORAL DAILY
Qty: 180 | Refills: 3 | Status: ACTIVE | COMMUNITY
Start: 2024-11-18 | End: 1900-01-01

## 2024-12-11 ENCOUNTER — APPOINTMENT (OUTPATIENT)
Dept: ORTHOPEDIC SURGERY | Facility: CLINIC | Age: 54
End: 2024-12-11
Payer: OTHER MISCELLANEOUS

## 2024-12-11 DIAGNOSIS — M75.41 IMPINGEMENT SYNDROME OF RIGHT SHOULDER: ICD-10-CM

## 2024-12-11 DIAGNOSIS — M75.51 BURSITIS OF RIGHT SHOULDER: ICD-10-CM

## 2024-12-11 DIAGNOSIS — M25.511 PAIN IN RIGHT SHOULDER: ICD-10-CM

## 2024-12-11 PROCEDURE — 99214 OFFICE O/P EST MOD 30 MIN: CPT

## 2024-12-16 ENCOUNTER — APPOINTMENT (OUTPATIENT)
Dept: ORTHOPEDIC SURGERY | Facility: CLINIC | Age: 54
End: 2024-12-16
Payer: OTHER MISCELLANEOUS

## 2024-12-16 VITALS — WEIGHT: 243 LBS | HEIGHT: 72 IN | BODY MASS INDEX: 32.91 KG/M2

## 2024-12-16 DIAGNOSIS — M54.12 RADICULOPATHY, CERVICAL REGION: ICD-10-CM

## 2024-12-16 PROCEDURE — 99214 OFFICE O/P EST MOD 30 MIN: CPT

## 2025-01-31 ENCOUNTER — APPOINTMENT (OUTPATIENT)
Dept: ORTHOPEDIC SURGERY | Facility: CLINIC | Age: 55
End: 2025-01-31

## (undated) DEVICE — STERIS DEFENDO 3-PIECE KIT (AIR/WATER, SUCTION & BIOPSY VALVES)